# Patient Record
Sex: MALE | Race: WHITE | NOT HISPANIC OR LATINO | URBAN - METROPOLITAN AREA
[De-identification: names, ages, dates, MRNs, and addresses within clinical notes are randomized per-mention and may not be internally consistent; named-entity substitution may affect disease eponyms.]

---

## 2017-04-11 NOTE — PATIENT DISCUSSION
1. Combined Types of Cataract OD: Discussed the risks benefits alternatives and limitations of cataract surgery including infection bleeding loss of vision retinal tears detachment. Monocular status reviewed with patient. The patient stated a full understanding and a desire to proceed with the procedure in the right eye. Refractive options were reviewed. Patient has elected to be optimized for distance vision in the right eye. The patient will still need glasses for reading and to possibly fine tune distance vision. 2. Trichiasis Left Eyelid - An inturned lash of the left eyelid was present. Epilation with a forceps was reommended. 3. Pseudophakia OS - IOL stable. Monitor. 4. Abnormal lashes epilated with forceps without difficulty5. CRAO OS - stable f/u Dr Bia Locke as scheduled. 6. Glaucoma suspect OU - s/p PI for narrow angles.

## 2017-04-19 NOTE — PATIENT DISCUSSION
1. Combined Types of Cataract OD: Discussed the risks benefits alternatives and limitations of cataract surgery including infection bleeding loss of vision retinal tears detachment. Monocular status reviewed with patient. The patient stated a full understanding and a desire to proceed with the procedure in the right eye. Refractive options were reviewed. Patient has elected to be optimized for distance vision in the right eye. The patient will still need glasses for reading and to possibly fine tune distance vision. Recommend ORA to improve accuracy repeat [place] is same as first one. 2. Trichiasis Left Eyelid - An inturned lash of the left eyelid was present. Epilation with a forceps was reommended. 3. Pseudophakia OS - IOL stable. Monitor. 4. Abnormal lashes epilated with forceps without difficulty5. CRAO OS - stable f/u Dr Darron Mendoza as scheduled. 6. Glaucoma suspect OU - s/p PI for narrow angles.

## 2017-04-25 NOTE — PATIENT DISCUSSION
1.  Post-Op Day #1 - Cataract Surgery Right Eye (OD) - doing well. Tears prn. Continue postop drops as directed. Call office with symptoms of pain redness or decreased vision in operative eye. 1 drop zylet instilled. 2. Return for an appointment in 1 week for post op exam. MRx. with Dr. Rosa Samuel.

## 2017-05-02 NOTE — PATIENT DISCUSSION
1.  Post-Op Week #2 -  Cataract Surgery Right Eye (OD) - Intraocular lens stable and surgery very well healed. Patient to resume all normal activities. Finish postop drops as directed. Final Refraction given if necessary. Myopic surprise OD despite double checking IOL calcs and doing ORA. Discussed options to correct myopic surprise. Discussed risks and benefits of IOL exchange Piggyback IOL PRK including retina disorders loss of vision infection. If patient chooses to proceed with IOL exchange would recommend doing it soon due to IOl fibrosing into place. Piggyback and [address] can be done at anytime. Also discussed [address] would do @ N/C. If patient gets glasses made here INS only. 2.  Return for an appointment in 1 month for dilated fundus exam and post op exam. MRx TOPOGRAPHY. with Dr. Oly Leslie.

## 2017-06-02 NOTE — PATIENT DISCUSSION
1.  s/p Cataract Surgery Right Eye (OD) - Intraocular lens stable and surgery very well healed. Patient to resume all normal activities. Myopic surprise despite intraoperative ORA measurement. Pt happy with distance glasses options for enhancement include piggyback OD and PRK OD. Pt defers for now. 2. Pseudophakia OU - IOLs stable. Monitor. 3. Nevus OD: Choroidal lesion stable followed by Dr Alexis Gentile. Will see him in 6 months. 4. CRAO OS - stable OSh/o BCC Left lid OD no evidence of atypical lesion5. Return for an appointment in 6 months for comprehensive exam. with Dr. Suzanna Bill.

## 2018-04-11 NOTE — PATIENT DISCUSSION
1.  Pseudophakia OD - IOL stable. Monitor. 2. Nevus OD: Choroidal lesion meets the criteria specified by the COMS study for a benign lesion. Advise continued observation. Discussed in detail with the patient. The patient voiced understanding. 3.  CRAO OS - stable f/u DR Citlaly Gamez. Return for an appointment in 12 months for comprehensive exam. with Dr. Hollis Vela.

## 2018-04-11 NOTE — PATIENT DISCUSSION
Nevus OD: Choroidal lesion meets the criteria specified by the COMS study for a benign lesion. Advise continued observation. Discussed in detail with the patient. The patient voiced understanding.

## 2020-05-12 NOTE — PATIENT DISCUSSION
1. Retinal Hemorrhage OD - possible BRVO. Consult Dr. Mariusz Younger. CRAO OS - longstanding. 3.  Nevus OD: stable. Monitor. 4.  Pseudophakia OD - IOL stable. Monitor for changes in vision. 5. Epiretinal Membrane OS - mild. Monitor6. Return for an appointment in 3 months for office call and refraction with Dr. Toyin Chery.

## 2020-05-13 NOTE — PROCEDURE NOTE: CLINICAL

## 2020-05-13 NOTE — PATIENT DISCUSSION
Capsular haze appears visually significant, RECOMMEND CONSULT with  [facility] for possible YAG capsulotomy.

## 2020-05-13 NOTE — PATIENT DISCUSSION
5 13 20 NEW SRF ON OCT WITH SUPERFICIAL HEM - NO PROGRESSION ON FUNDUS OR B SCAN - DOES NOT LOOK LIKE MM - MORE LIKELY CNV 2ND AMD/NEVUS.

## 2020-06-11 NOTE — PROCEDURE NOTE: CLINICAL
PROCEDURE NOTE: Lucentis 0.5mg PFS OD. Diagnosis: Neovascular AMD with Active CNV. Prior to injection, risks/benefits/alternatives discussed including but not limited to infection, loss of vision or eye, hemorrhage, cataract, glaucoma, retinal tears or detachment. The patient wished to proceed with treatment. Topical anesthesia was induced with Alcaine. Additional anesthesia was achieved using drop(s) or injection checked above. A drop of Povidone-iodine 5% ophthalmic solution was instilled over the injection site and in the inferior fornix. Betadine prep was performed. A single use prefilled syringe of intravitreal Lucentis 0.5mg/0.05ml was used and excess was disposed of as waste. The needle was passed 3.0 mm posterior to the limbus in pseudophakic patients, and 3.5 mm posterior to the limbus in phakic patients. Injection Time 550. Patient tolerated the procedure well. There were no complications. The eye was irrigated with sterile irrigating solution. Post procedure instructions given. The patient was instructed to use Artificial Tears q.i.d. p.r.n for comfort. The patient was instructed to return for re-evaluation in approximately 4-12 weeks depending on his/her condition and was told to call immediately if vision decreases and/or if his/her eye becomes red, painful, and/or light sensitive. The patient was instructed to go to the emergency room or call 911 if unable to reach the doctor within an hour or two of trying or calling. Cecil Coe

## 2020-06-11 NOTE — PATIENT DISCUSSION
Reviewed OPTIONS including AVASTIN/EYLEA/LUCENTIS/BEOVU and patient wants to proceed with LUCENTIS treatment AND REPEAT EVERY 29 DAYS X 3.

## 2020-06-11 NOTE — PATIENT DISCUSSION
MILD HEIGHT ON B SCAN AND REFLECTIVITY SUGGEST CALCIUM. Problem: Non-Pressure Injury Wound  Goal: # No deterioration in wound  Outcome: Outcome Met, Continue evaluating goal progress toward completion  No drainage from wound    Problem: Diabetes  Goal: Glycemic balance achieved/maintained  Goal is to maintain blood sugar within range with no episodes of hypoglycemia   Outcome: Outcome Met, Continue evaluating goal progress toward completion  Patient's blood sugar WDL    Problem: VTE, Risk for  Goal: # No s/s of VTE  Outcome: Outcome Met, Continue evaluating goal progress toward completion  Patient shows no s/s of VTEs    Problem: Pressure Injury, Risk for  Intervention: # Turn/reposition patient q 2 hours  Select this intervention if patient requires turning/repositioning assistance.  Patient repositions independently    Goal: # Skin remains intact  Outcome: Outcome Met, Continue evaluating goal progress toward completion  No new skin injuries/wounds

## 2020-06-11 NOTE — PATIENT DISCUSSION
Capsular haze appears visually significant, RECOMMEND CONSULT with  [facility] for possible YAG capsulotomy.

## 2020-07-13 NOTE — PATIENT DISCUSSION
Capsular haze appears visually significant, RECOMMEND CONSULT with  [facility] for possible YAG capsulotomy.

## 2020-07-13 NOTE — PATIENT DISCUSSION
5 13 20 Reviewed OPTIONS including AVASTIN/EYLEA/LUCENTIS/BEOVU and patient wants to proceed with LUCENTIS treatment AND REPEAT EVERY 29 DAYS X 3.

## 2020-07-14 NOTE — PATIENT DISCUSSION
1.  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 IOP was 30 yesterday started on timolol yesterday with improvement in IOP. Will take a month to know full response to drops. IOP goal 20 or less. Continue with current treatment plan. Discussed importance of compliance. Will continue to monitor for stability or progression  2. Exudative Macular Degeneration OD - Treatment options were discussed including anti-VEGF injections. The importance of followup visits with Dr Padmini Patel and continued treatment was discussed. 3.  Pseudophakia OU - IOLs stable. Monitor for changes in vision. 4. Nevus OD: Choroidal lesion meets the criteria specified by the COMS study for a benign lesion. Advise continued observation. Discussed in detail with the patient. The patient voiced understanding. 5.  CRVO OS - stable monitor6. Return for an appointment in 1 month for pressure check. VF 24-2. with Dr. Mayra Hair.

## 2020-07-14 NOTE — PATIENT DISCUSSION
Retinal Hemorrhage OD - Patient was found to have a non-specific hemorrhage of the retina. The most likely etiologies of this would be Hypertension Diabetes or other vascular disorders. Patient will be followed to deturmine if other signs or symptoms become characteristic of a specific diagnosis. Recommend evaluation with primary medical practitioner.

## 2020-07-14 NOTE — PATIENT DISCUSSION
Exudative Macular Degeneration OD - Treatment options were discussed including anti-VEGF injections. The importance of followup visits with Dr Heri De La Cruz and continued treatment was discussed.

## 2020-07-14 NOTE — PATIENT DISCUSSION
Primary open angle glaucoma OD mild:  Continue with current treatment plan. Discussed importance of compliance. Will continue to monitor for stability or progression.

## 2020-07-21 NOTE — PROCEDURE NOTE: CLINICAL

## 2020-07-21 NOTE — PATIENT DISCUSSION
Capsular haze appears visually significant, RECOMMEND CONSULT with  [facility] for possible YAG capsulotomy.

## 2020-07-21 NOTE — PATIENT DISCUSSION
7 21 20 PT TO RETURN FOR REEVAL IN 5 WKS - HAVING OSTOMY BAG REMOVED ON 8/17/20 SO TO DELAY FOLLOW UP @ 1 WK.

## 2020-08-04 NOTE — PATIENT DISCUSSION
1.  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 Baseline VF test today. IOP OK on timolol Continue with current treatment plan. Discussed importance of compliance. Will continue to monitor for stability or progression  2. Exudative Macular Degeneration OD - Treatment options were discussed including anti-VEGF injections. The importance of followup visits with Dr Arelis Villegas and continued treatment was discussed. Having colon surgery and wants to get injection early and willing to pay for it out of pocket if necessary3. Pseudophakia OU - IOLs stable. Monitor for changes in vision. 4. Nevus OD: Choroidal lesion meets the criteria specified by the COMS study for a benign lesion. Advise continued observation. Discussed in detail with the patient. The patient voiced understanding.   5.  CRVO OS - stable monitor

## 2020-09-08 NOTE — PROCEDURE NOTE: CLINICAL
PROCEDURE NOTE: Lucentis 0.5mg PFS OD. Diagnosis: Neovascular AMD with Active CNV. Prior to injection, risks/benefits/alternatives discussed including but not limited to infection, loss of vision or eye, hemorrhage, cataract, glaucoma, retinal tears or detachment. The patient wished to proceed with treatment. Topical anesthesia was induced with Alcaine. Additional anesthesia was achieved using drop(s) or injection checked above. A drop of Povidone-iodine 5% ophthalmic solution was instilled over the injection site and in the inferior fornix. Betadine prep was performed. A single use prefilled syringe of intravitreal Lucentis 0.5mg/0.05ml was used and excess was disposed of as waste. The needle was passed 3.0 mm posterior to the limbus in pseudophakic patients, and 3.5 mm posterior to the limbus in phakic patients. Injection Time 550. Patient tolerated the procedure well. There were no complications. The eye was irrigated with sterile irrigating solution. Post procedure instructions given. The patient was instructed to use Artificial Tears q.i.d. p.r.n for comfort. The patient was instructed to return for re-evaluation in approximately 4-12 weeks depending on his/her condition and was told to call immediately if vision decreases and/or if his/her eye becomes red, painful, and/or light sensitive. The patient was instructed to go to the emergency room or call 911 if unable to reach the doctor within an hour or two of trying or calling. Mo Yarbrough

## 2020-09-08 NOTE — PATIENT DISCUSSION
Capsular haze appears visually significant, RECOMMEND CONSULT with  [facility] for possible YAG capsulotomy.

## 2020-09-08 NOTE — PATIENT DISCUSSION
9 8 20 PATENT PI - STILL NARROW WITH INCREASED IOP - SAW DR JENNINGS 8 4 20 - RECOM REEVAL IN NEXT FEW DAYS.

## 2020-09-21 NOTE — PATIENT DISCUSSION
9 21 20 MOVEMENT OF SRF UNDER FOVEA DESPITE L TX 13 DAYS AGO - TRACKED FROM SUPPERIORLY - TO REEVAL IN 15 DAYS AND CONSIDER SWITCHING TO EYELEA.

## 2020-09-21 NOTE — PATIENT DISCUSSION
Capsular haze appears visually significant, RECOMMEND CONSULT with  [facility] for possible YAG capsulotomy.

## 2020-10-07 NOTE — PATIENT DISCUSSION
Recommend OBSERVATION and continued MONITORING for progression to exudative AMD. Metabolic acidosis, increased anion gap Metabolic acidosis, increased anion gap COPD (chronic obstructive pulmonary disease)

## 2020-10-07 NOTE — PROCEDURE NOTE: CLINICAL

## 2020-10-09 NOTE — PATIENT DISCUSSION
Exudative Macular Degeneration OD - Treatment options were discussed including anti-VEGF injections. The importance of followup visits with retina and continued treament was discussed. Having injections every 29 days.

## 2020-10-09 NOTE — PATIENT DISCUSSION
1.  [facility] patent some shallowing temporal but deep everywhere else. IOP borderline add latanoprost qhs. Discussed importance of compliance. Will continue to monitor for stability or progression  2. Exudative Macular Degeneration OD - Treatment options were discussed including anti-VEGF injections. The importance of followup visits with retina and continued treament was discussed. Having injections every 29 days. 3.  PCO  OD (Posterior Capsule Opacification)  t/c yag caps after glaucoma is stable. DF next visit with MRX 4. Pseudophakia OU - IOLs stable. Monitor for changes in vision. 5. CRAO OS - old stableReturn for an appointment in 3 weeks for dilated fundus exam. MRx. OD only with Dr. Michael Henderson.

## 2020-10-09 NOTE — PATIENT DISCUSSION
CRVO OS - old stableReturn for an appointment in 3 weeks for dilated fundus exam. MRx. OD only with Dr. Kamaljit Merino.

## 2020-10-09 NOTE — PATIENT DISCUSSION
PCO  OD (Posterior Capsule Opacification)  t/c yag caps after glaucoma is stable.   DF next visit with KAT

## 2020-10-30 NOTE — PATIENT DISCUSSION
1.  PCO  OU (Posterior Capsule Opacification)   PCO is visually significant and impairment of vision does not meet the patient’s functional needs or interferes with activities of daily living. Risks benefits and alternatives to the Nd:YAG Laser reviewed including elevated IOP immediately postop and retinal tear/detachment. Patient to notify their ophthalmologist promptly if they have a significant change in symptoms such as flashes of light (photopsia) an increase in floaters loss of visual field or decrease in visual acuity after the procedure. Patient will be scheduled in [place] [id number] for Nd:YAG Laser. Schedule yag caps OD/OS vision will be limited by the retina. Pt sees Dr Jhonatan Jamil next week for [employer]. 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 Continue with current treatment plan. Discussed importance of compliance. Will continue to monitor for stability or progression    3. Exudative Macular Degeneration OD - Treatment options were discussed including anti-VEGF injections. The importance of followup visits with retina and continued treament was discussed. 4.  CRAO OS - stable5. Pseudophakia OU - IOLs stable. Monitor for changes in vision. 6. Return for an appointment for Yag Capsulotomy. OD/OS 4 month DFTA with OCT nerve with Dr. Kaila Christian.

## 2020-10-30 NOTE — PATIENT DISCUSSION
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 Continue with current treatment plan. Discussed importance of compliance.   Will continue to monitor for stability or progression

## 2020-10-30 NOTE — PATIENT DISCUSSION
Return for an appointment for Yag Capsulotomy. OD/OS 4 month DFTA with OCT nerve with Dr. Elvis Arciniega.

## 2020-11-05 NOTE — PROCEDURE NOTE: CLINICAL

## 2020-12-07 NOTE — PROCEDURE NOTE: CLINICAL

## 2020-12-07 NOTE — PATIENT DISCUSSION
9 8 20 PATENT PI - STILL NARROW WITH INCREASED IOP - SAW DR EJNNINGS 8 4 20 - RECOM REEVAL IN NEXT FEW DAYS.

## 2021-01-06 NOTE — PROCEDURE NOTE: CLINICAL
PROCEDURE NOTE: Lucentis 0.5mg PFS OD. Diagnosis: Neovascular AMD with Active CNV. Anesthesia: Topical. Prep: Betadine Flush. Prior to injection, risks/benefits/alternatives discussed including but not limited to infection, loss of vision or eye, hemorrhage, cataract, glaucoma, retinal tears or detachment. The patient wished to proceed with treatment. Topical anesthesia was induced with Alcaine. Additional anesthesia was achieved using drop(s) or injection checked above. A drop of Povidone-iodine 5% ophthalmic solution was instilled over the injection site and in the inferior fornix. Betadine prep was performed. A single use prefilled syringe of intravitreal Lucentis 0.5mg/0.05ml was used and excess was disposed of as waste. The needle was passed 3.0 mm posterior to the limbus in pseudophakic patients, and 3.5 mm posterior to the limbus in phakic patients. Injection Time 5:35 PM. Patient tolerated the procedure well. There were no complications. The eye was irrigated with sterile irrigating solution. Post procedure instructions given. The patient was instructed to use Artificial Tears q.i.d. p.r.n for comfort. The patient was instructed to return for re-evaluation in approximately 4-12 weeks depending on his/her condition and was told to call immediately if vision decreases and/or if his/her eye becomes red, painful, and/or light sensitive. The patient was instructed to go to the emergency room or call 911 if unable to reach the doctor within an hour or two of trying or calling. Antonio Stone
normal mouth and gums

## 2021-03-08 NOTE — PROCEDURE NOTE: CLINICAL
PROCEDURE NOTE: Lucentis 0.5mg PFS OD. Diagnosis: Neovascular AMD with Active CNV. Anesthesia: Lidocaine 4%. Prep: Betadine Flush. Prior to injection, risks/benefits/alternatives discussed including but not limited to infection, loss of vision or eye, hemorrhage, cataract, glaucoma, retinal tears or detachment. The patient wished to proceed with treatment. Topical anesthesia was induced with Alcaine. Additional anesthesia was achieved using drop(s) or injection checked above. A drop of Povidone-iodine 5% ophthalmic solution was instilled over the injection site and in the inferior fornix. Betadine prep was performed. A single use prefilled syringe of intravitreal Lucentis 0.5mg/0.05ml was used and excess was disposed of as waste. The needle was passed 3.0 mm posterior to the limbus in pseudophakic patients, and 3.5 mm posterior to the limbus in phakic patients. Injection Time 9:25 AM. Patient tolerated the procedure well. There were no complications. The eye was irrigated with sterile irrigating solution. Post procedure instructions given. The patient was instructed to use Artificial Tears q.i.d. p.r.n for comfort. The patient was instructed to return for re-evaluation in approximately 4-12 weeks depending on his/her condition and was told to call immediately if vision decreases and/or if his/her eye becomes red, painful, and/or light sensitive. The patient was instructed to go to the emergency room or call 911 if unable to reach the doctor within an hour or two of trying or calling. INVELTYS GIVEN PRIOR TO INJECTION, PER GABBY.

## 2021-03-19 NOTE — PATIENT DISCUSSION
1.  IOP at target OCT today stable. Continue with current treatment plan. Discussed importance of compliance. Will continue to monitor for stability or progression    2. Pseudophakia OU - IOLs stable. Monitor for changes in vision. 3. CRAO OS - 4. Exudative Macular Degeneration OD - Treatment options were discussed including anti-VEGF injections. The importance of followup visits with retina and continued treament was discussed. 5.  Return for an appointment in 6 months for pressure check. VF 24-2. with Dr. Rojelio Busch.

## 2021-03-26 ENCOUNTER — IMPORTED ENCOUNTER (OUTPATIENT)
Dept: URBAN - METROPOLITAN AREA CLINIC 1 | Facility: CLINIC | Age: 71
End: 2021-03-26

## 2021-03-26 PROBLEM — H04.123: Noted: 2021-03-26

## 2021-03-26 PROBLEM — H01.001: Noted: 2021-03-26

## 2021-03-26 PROBLEM — H01.004: Noted: 2021-03-26

## 2021-03-26 PROBLEM — H25.813: Noted: 2021-03-26

## 2021-03-26 PROBLEM — H16.143: Noted: 2021-03-26

## 2021-03-26 PROCEDURE — 99204 OFFICE O/P NEW MOD 45 MIN: CPT

## 2021-03-26 PROCEDURE — 92015 DETERMINE REFRACTIVE STATE: CPT

## 2021-03-26 NOTE — PATIENT DISCUSSION
1.  Cataract OU- Visually Significant discussed the risks benefits alternatives and limitations of cataract surgery. The patient stated a full understanding and a desire to proceed with the procedure. The patient will need to return for preop appointment with cataract measurements and to have any additional questions answered and start pre-operative eye drops as directed. Phaco PCL OS then ODPMG did not seeOtherwise follow-up 6 month 10/DFE/Glare2. RIAZ w/ PEK OU- Recommend the frequent use of AT's TID OU routinely. (sample given of BLINK)3. Anterior Blepharitis OU- Daily Hot compresses and lid scrubs were recommended. MRx for glasses was done do not release scheduling PhacoReturn for an appointment in [place]scan/H&P with Dr. Yary Contreras.

## 2021-04-05 ENCOUNTER — IMPORTED ENCOUNTER (OUTPATIENT)
Dept: URBAN - METROPOLITAN AREA CLINIC 1 | Facility: CLINIC | Age: 71
End: 2021-04-05

## 2021-04-05 PROBLEM — H25.813: Noted: 2021-04-05

## 2021-04-05 PROCEDURE — 92136 OPHTHALMIC BIOMETRY: CPT

## 2021-04-05 PROCEDURE — 99213 OFFICE O/P EST LOW 20 MIN: CPT

## 2021-04-05 NOTE — PATIENT DISCUSSION
RIAZ w/ PEK OU- Recommend ATs TID OU routinely 3. Anterior Blepharitis OU- Daily Hot compresses and lid scrubs were recommended. Return for an appointment for Return as scheduled with Dr. Anna Lewis.

## 2021-04-05 NOTE — PATIENT DISCUSSION
1.  Cataract OU:  Visually Significant secondary to glare discussed the risks benefits alternatives and limitations of cataract surgery. The patient stated a full understanding and a desire to proceed with the procedure. Discussed with patient if PO Gtts are more than $120 for all three combined when filling at their Pharmacy please call our office to request generic substitutions. Pt understands they will need glasses post-op to achieve their best corrected vision. Phaco PCL OS then OD Phaco PCL OS  Lifestyle Questionnaire Completed. 2.  RIAZ w/ PEK OU- Recommend ATs TID OU routinely 3. Anterior Blepharitis OU- Daily Hot compresses and lid scrubs were recommended. Return for an appointment for Return as scheduled with Dr. Meg Mustafa.

## 2021-04-12 NOTE — PATIENT DISCUSSION
EQUIVOCAL FLUID  ON OCT 5 14 18 - MILD IMPROVEMENT. Attending Psychiatrist supervising NP/Trainee, meeting pt...

## 2021-04-13 NOTE — PROCEDURE NOTE: CLINICAL

## 2021-04-14 ENCOUNTER — IMPORTED ENCOUNTER (OUTPATIENT)
Dept: URBAN - METROPOLITAN AREA CLINIC 1 | Facility: CLINIC | Age: 71
End: 2021-04-14

## 2021-04-15 ENCOUNTER — IMPORTED ENCOUNTER (OUTPATIENT)
Dept: URBAN - METROPOLITAN AREA CLINIC 1 | Facility: CLINIC | Age: 71
End: 2021-04-15

## 2021-04-15 PROBLEM — Z96.1: Noted: 2021-04-15

## 2021-04-15 PROCEDURE — 99024 POSTOP FOLLOW-UP VISIT: CPT

## 2021-04-15 NOTE — PATIENT DISCUSSION
POD#1 CE/IOL OS (Standard) doing well. Use Prednisolone BID OS Bromfenac Qdaily OS [place] TID OS : Use all three gtts through completion of PO gtt chart regimen/ Per our instructions given to patient.   Post op Warnings Reiterated RTC as scheduled

## 2021-04-22 ENCOUNTER — IMPORTED ENCOUNTER (OUTPATIENT)
Dept: URBAN - METROPOLITAN AREA CLINIC 1 | Facility: CLINIC | Age: 71
End: 2021-04-22

## 2021-04-22 PROBLEM — H25.811: Noted: 2021-04-22

## 2021-04-22 PROCEDURE — 92136 OPHTHALMIC BIOMETRY: CPT

## 2021-04-23 NOTE — PATIENT DISCUSSION
Anterior Blepharitis OU - Daily Hot compresses and lid scrubs were recommended. Relevant Problems   No relevant active problems       Anesthetic History   No history of anesthetic complications            Review of Systems / Medical History  Patient summary reviewed, nursing notes reviewed and pertinent labs reviewed    Pulmonary  Within defined limits                 Neuro/Psych         Headaches     Cardiovascular  Within defined limits                     GI/Hepatic/Renal     GERD           Endo/Other  Within defined limits           Other Findings              Physical Exam    Airway  Mallampati: II  TM Distance: 4 - 6 cm  Neck ROM: normal range of motion   Mouth opening: Normal     Cardiovascular    Rhythm: regular  Rate: normal         Dental    Dentition: Lower dentition intact and Upper dentition intact     Pulmonary  Breath sounds clear to auscultation               Abdominal         Other Findings            Anesthetic Plan    ASA: 2  Anesthesia type: general          Induction: Intravenous  Anesthetic plan and risks discussed with: Patient

## 2021-04-28 ENCOUNTER — IMPORTED ENCOUNTER (OUTPATIENT)
Dept: URBAN - METROPOLITAN AREA CLINIC 1 | Facility: CLINIC | Age: 71
End: 2021-04-28

## 2021-04-29 ENCOUNTER — IMPORTED ENCOUNTER (OUTPATIENT)
Dept: URBAN - METROPOLITAN AREA CLINIC 1 | Facility: CLINIC | Age: 71
End: 2021-04-29

## 2021-04-29 PROBLEM — Z96.1: Noted: 2021-04-29

## 2021-04-29 PROCEDURE — 99024 POSTOP FOLLOW-UP VISIT: CPT

## 2021-04-29 NOTE — PATIENT DISCUSSION
1. POD#1 Phaco/ PCL OD (Standard) - doing well. Use Prednisolone BID OD Bromfenac Qdaily OD Ocuflox TID OD : Use all three gtts through completion of PO gtt chart regimen/ Per our instructions given. Post op Warnings Reiterated 2. POW#3 Phaco/ PCL OS (Standard) - doing well  Use Prednisolone BID OS & Bromfenac Qdaily OS: Use through completion of po gtt regimen.  RTC as scheduled

## 2021-05-12 NOTE — PROCEDURE NOTE: CLINICAL

## 2021-05-12 NOTE — PATIENT DISCUSSION
LUCENTIS AND REPEAT EVERY 29 DAYS X 3 DOI - TRACE SRF AT 29 DAYS - DOING WELL - RETX AND KEEPT 29 DAYS - IF CONTINUES TO DO WELL CONSIDER ^ TIME BETWEEN TXS.

## 2021-05-20 ENCOUNTER — IMPORTED ENCOUNTER (OUTPATIENT)
Dept: URBAN - METROPOLITAN AREA CLINIC 1 | Facility: CLINIC | Age: 71
End: 2021-05-20

## 2021-05-20 PROBLEM — Z09: Noted: 2021-05-20

## 2021-05-20 NOTE — PATIENT DISCUSSION
POW#3 Phaco/ PCL Standard OUFinish PO meds per PO gtt schedule MRX for glasses givenReturn for an appointment in March with Dr. Lydia Moise.

## 2021-06-10 NOTE — PROCEDURE NOTE: CLINICAL
PROCEDURE NOTE: Lucentis 0.5mg PFS OD. Diagnosis: Neovascular AMD with Active CNV. Anesthesia: Lidocaine 4%. Prep: Betadine Flush. Prior to injection, risks/benefits/alternatives discussed including but not limited to infection, loss of vision or eye, hemorrhage, cataract, glaucoma, retinal tears or detachment. The patient wished to proceed with treatment. Topical anesthesia was induced with Alcaine. Additional anesthesia was achieved using drop(s) or injection checked above. A drop of Povidone-iodine 5% ophthalmic solution was instilled over the injection site and in the inferior fornix. Betadine prep was performed. A single use prefilled syringe of intravitreal Lucentis 0.5mg/0.05ml was used and excess was disposed of as waste. The needle was passed 3.0 mm posterior to the limbus in pseudophakic patients, and 3.5 mm posterior to the limbus in phakic patients. Injection Time 2:00 PM. Patient tolerated the procedure well. There were no complications. The eye was irrigated with sterile irrigating solution. Post procedure instructions given. The patient was instructed to use Artificial Tears q.i.d. p.r.n for comfort. The patient was instructed to return for re-evaluation in approximately 4-12 weeks depending on his/her condition and was told to call immediately if vision decreases and/or if his/her eye becomes red, painful, and/or light sensitive. The patient was instructed to go to the emergency room or call 911 if unable to reach the doctor within an hour or two of trying or calling. Marjorie Salguero

## 2021-07-12 NOTE — PROCEDURE NOTE: CLINICAL

## 2021-08-11 NOTE — PROCEDURE NOTE: CLINICAL

## 2021-09-14 NOTE — PROCEDURE NOTE: CLINICAL
PROCEDURE NOTE: Lucentis 0.5mg PFS OD. Diagnosis: Neovascular AMD with Active CNV. Anesthesia: Lidocaine 4%. Prep: Betadine Flush. Prior to injection, risks/benefits/alternatives discussed including but not limited to infection, loss of vision or eye, hemorrhage, cataract, glaucoma, retinal tears or detachment. The patient wished to proceed with treatment. Topical anesthesia was induced with Alcaine. Additional anesthesia was achieved using drop(s) or injection checked above. A drop of Povidone-iodine 5% ophthalmic solution was instilled over the injection site and in the inferior fornix. Betadine prep was performed. A single use prefilled syringe of intravitreal Lucentis 0.5mg/0.05ml was used and excess was disposed of as waste. The needle was passed 3.0 mm posterior to the limbus in pseudophakic patients, and 3.5 mm posterior to the limbus in phakic patients. Injection Time 4:00 PM. Patient tolerated the procedure well. There were no complications. The eye was irrigated with sterile irrigating solution. Post procedure instructions given. The patient was instructed to use Artificial Tears q.i.d. p.r.n for comfort. The patient was instructed to return for re-evaluation in approximately 4-12 weeks depending on his/her condition and was told to call immediately if vision decreases and/or if his/her eye becomes red, painful, and/or light sensitive. The patient was instructed to go to the emergency room or call 911 if unable to reach the doctor within an hour or two of trying or calling. INVELTYS GIVEN PRIOR TO INJECTION, PER CHRISTI Zayas

## 2021-09-14 NOTE — PATIENT DISCUSSION
5 12 21 LUCENTIS AND REPEAT EVERY 29 DAYS X 3 DOI - TRACE SRF AT 29 DAYS - DOING WELL - RETX AND KEEPT 29 DAYS - IF CONTINUES TO DO WELL CONSIDER ^ TIME BETWEEN TXS.

## 2021-09-22 NOTE — PATIENT DISCUSSION
1.  IOP at target IOP VF today stable. Continue with current treatment plan. Discussed importance of compliance. Will continue to monitor for stability or progression    2. Pseudophakia OU - IOLs stable. Monitor for changes in vision. 3. CRAO OS - 4. Exudative Macular Degeneration OD - Treatment options were discussed including anti-VEGF injections. The importance of followup visits with retina and continued treament was discussed. Return for an appointment in 6 months for comprehensive exam. OCT Nerve. MRx. pt interested in new glasses with Dr. Garcia Erm.

## 2021-10-20 NOTE — PATIENT DISCUSSION
Follow up with Dr. Durham from infectious disease    Follow up with Annual testing in November.    RESOLVED 5 12 21.

## 2021-10-20 NOTE — PROCEDURE NOTE: CLINICAL

## 2021-11-18 NOTE — PROCEDURE NOTE: CLINICAL

## 2021-12-29 NOTE — PATIENT DISCUSSION
12 29 21 PT NEEDS ASA TO PREVENT A STROKE - REVIEWED RISK OF RETINAL BLEED - BUT RISK OF STROKE OUTWEIGHS RISK OF SRH.

## 2021-12-29 NOTE — PROCEDURE NOTE: CLINICAL
PROCEDURE NOTE: Lucentis 0.5mg PFS OD. Diagnosis: Neovascular AMD with Active CNV. Anesthesia: Lidocaine 4%. Prep: Betadine Flush. Prior to injection, risks/benefits/alternatives discussed including but not limited to infection, loss of vision or eye, hemorrhage, cataract, glaucoma, retinal tears or detachment. The patient wished to proceed with treatment. Topical anesthesia was induced with Alcaine. Additional anesthesia was achieved using drop(s) or injection checked above. A drop of Povidone-iodine 5% ophthalmic solution was instilled over the injection site and in the inferior fornix. Betadine prep was performed. A single use prefilled syringe of intravitreal Lucentis 0.5mg/0.05ml was used and excess was disposed of as waste. The needle was passed 3.0 mm posterior to the limbus in pseudophakic patients, and 3.5 mm posterior to the limbus in phakic patients. Injection Time 2:00 PM. Patient tolerated the procedure well. There were no complications. The eye was irrigated with sterile irrigating solution. Post procedure instructions given. The patient was instructed to use Artificial Tears q.i.d. p.r.n for comfort. The patient was instructed to return for re-evaluation in approximately 4-12 weeks depending on his/her condition and was told to call immediately if vision decreases and/or if his/her eye becomes red, painful, and/or light sensitive. The patient was instructed to go to the emergency room or call 911 if unable to reach the doctor within an hour or two of trying or calling. Maribell Galvin

## 2021-12-29 NOTE — PATIENT DISCUSSION
9 14 21 MILD EDEMA SUP TO FOVEA 5 WKS - RETX L AND KEEP 5 WKS. Area H Indication Text: Tumors in this location are included in Area H (eyelids, eyebrows, nose, lips, chin, ear, pre-auricular, post-auricular, temple, genitalia, hands, feet, ankles and areola).  Tissue conservation is critical in these anatomic locations.

## 2022-02-02 NOTE — PROCEDURE NOTE: CLINICAL
PROCEDURE NOTE: Lucentis 0.5mg PFS OD. Diagnosis: Neovascular AMD with Active CNV. Anesthesia: Lidocaine 4%. Prep: Betadine Flush. Prior to injection, risks/benefits/alternatives discussed including but not limited to infection, loss of vision or eye, hemorrhage, cataract, glaucoma, retinal tears or detachment. The patient wished to proceed with treatment. Topical anesthesia was induced with Alcaine. Additional anesthesia was achieved using drop(s) or injection checked above. A drop of Povidone-iodine 5% ophthalmic solution was instilled over the injection site and in the inferior fornix. Betadine prep was performed. A single use prefilled syringe of intravitreal Lucentis 0.5mg/0.05ml was used and excess was disposed of as waste. The needle was passed 3.0 mm posterior to the limbus in pseudophakic patients, and 3.5 mm posterior to the limbus in phakic patients. Injection Time 1:45PM. Patient tolerated the procedure well. There were no complications. The eye was irrigated with sterile irrigating solution. Post procedure instructions given. The patient was instructed to use Artificial Tears q.i.d. p.r.n for comfort. The patient was instructed to return for re-evaluation in approximately 4-12 weeks depending on his/her condition and was told to call immediately if vision decreases and/or if his/her eye becomes red, painful, and/or light sensitive. The patient was instructed to go to the emergency room or call 911 if unable to reach the doctor within an hour or two of trying or calling. Fabien Weber

## 2022-03-09 NOTE — PROCEDURE NOTE: CLINICAL

## 2022-03-23 NOTE — PATIENT DISCUSSION
1.  Poag OD stable IOP at target IOP OCT  stable. Continue with current treatment plan. Discussed importance of compliance. Will continue to monitor for stability or progression    2. Pseudophakia OU - IOLs stable. Monitor for changes in vision. 3. CRAO OS - stable4. Exudative Macular Degeneration OD - Treatment options were discussed including anti-VEGF injections. The importance of followup visits with retina and continued treament was discussed. Return for an appointment in 6 months for pressure check. VF 24-2. with Dr. Dick Jacob.

## 2022-03-23 NOTE — PATIENT DISCUSSION
7 13 20 TO START T 1/2 BID AND RECOM EVAL  [facility] W/IN 1-2 DAYS. forehead

## 2022-04-08 ASSESSMENT — VISUAL ACUITY
OD_CC: J2
OD_CC: 20/60
OS_GLARE: 20/200
OD_CC: 20/50-2
OD_GLARE: 20/150
OD_GLARE: 20/150
OS_SC: 20/40
OD_CC: 20/70
OD_SC: 20/25-2
OS_CC: 20/70
OS_CC: 20/25-2
OS_GLARE: 20/200
OS_CC: 20/25
OD_GLARE: 20/150
OS_CC: 20/50
OS_CC: J3
OD_CC: J2
OS_SC: 20/40
OS_CC: 20/70
OS_CC: 20/50
OS_PH: SC 20/30
OS_CC: J3
OD_SC: 20/25
OD_CC: 20/70
OD_CC: 20/25-2

## 2022-04-08 ASSESSMENT — KERATOMETRY
OS_K2POWER_DIOPTERS: 45.75
OS_K2POWER_DIOPTERS: 45.25
OS_K1POWER_DIOPTERS: 44.00
OD_K1POWER_DIOPTERS: 44.25
OD_K2POWER_DIOPTERS: 45.50
OD_K1POWER_DIOPTERS: 44.25
OD_AXISANGLE2_DEGREES: 009
OD_AXISANGLE2_DEGREES: 178
OS_AXISANGLE2_DEGREES: 162
OS_AXISANGLE2_DEGREES: 002
OD_AXISANGLE_DEGREES: 088
OS_AXISANGLE_DEGREES: 092
OS_AXISANGLE_DEGREES: 072
OS_K1POWER_DIOPTERS: 44.25
OD_K2POWER_DIOPTERS: 45.50
OD_AXISANGLE_DEGREES: 099

## 2022-04-08 ASSESSMENT — TONOMETRY
OS_IOP_MMHG: 16
OD_IOP_MMHG: 16
OS_IOP_MMHG: 16
OS_IOP_MMHG: 16
OD_IOP_MMHG: 16
OS_IOP_MMHG: 16
OS_IOP_MMHG: 16
OD_IOP_MMHG: 16
OD_IOP_MMHG: 16

## 2022-04-13 ENCOUNTER — COMPREHENSIVE EXAM (OUTPATIENT)
Dept: URBAN - METROPOLITAN AREA CLINIC 1 | Facility: CLINIC | Age: 72
End: 2022-04-13

## 2022-04-13 DIAGNOSIS — Z96.1: ICD-10-CM

## 2022-04-13 DIAGNOSIS — H04.123: ICD-10-CM

## 2022-04-13 DIAGNOSIS — H01.021: ICD-10-CM

## 2022-04-13 DIAGNOSIS — H01.024: ICD-10-CM

## 2022-04-13 DIAGNOSIS — H16.143: ICD-10-CM

## 2022-04-13 PROCEDURE — 92015 DETERMINE REFRACTIVE STATE: CPT

## 2022-04-13 PROCEDURE — 92014 COMPRE OPH EXAM EST PT 1/>: CPT

## 2022-04-13 ASSESSMENT — VISUAL ACUITY
OS_CC: 20/25
OS_CC: J2
OD_CC: 20/25
OD_CC: J2

## 2022-04-13 ASSESSMENT — TONOMETRY
OS_IOP_MMHG: 16
OD_IOP_MMHG: 16

## 2022-04-20 NOTE — PROCEDURE NOTE: CLINICAL

## 2022-05-25 NOTE — PROCEDURE NOTE: CLINICAL

## 2022-07-06 NOTE — PROCEDURE NOTE: CLINICAL
PROCEDURE NOTE: Lucentis 0.5mg PFS OD. Diagnosis: Neovascular AMD with Active CNV. Anesthesia: Lidocaine 4%. Prep: Betadine Flush. Prior to injection, risks/benefits/alternatives discussed including but not limited to infection, loss of vision or eye, hemorrhage, cataract, glaucoma, retinal tears or detachment. The patient wished to proceed with treatment. Topical anesthesia was induced with Alcaine. Additional anesthesia was achieved using drop(s) or injection checked above. A drop of Povidone-iodine 5% ophthalmic solution was instilled over the injection site and in the inferior fornix. Betadine prep was performed. A single use prefilled syringe of intravitreal Lucentis 0.5mg/0.05ml was used and excess was disposed of as waste. The needle was passed 3.0 mm posterior to the limbus in pseudophakic patients, and 3.5 mm posterior to the limbus in phakic patients. Injection Time *. Patient tolerated the procedure well. There were no complications. The eye was irrigated with sterile irrigating solution. Post procedure instructions given. The patient was instructed to use Artificial Tears q.i.d. p.r.n for comfort. The patient was instructed to return for re-evaluation in approximately 4-12 weeks depending on his/her condition and was told to call immediately if vision decreases and/or if his/her eye becomes red, painful, and/or light sensitive. The patient was instructed to go to the emergency room or call 911 if unable to reach the doctor within an hour or two of trying or calling. Maribell Galvin

## 2022-08-17 NOTE — PROCEDURE NOTE: CLINICAL
PROCEDURE NOTE: Lucentis 0.5mg PFS OD. Diagnosis: Neovascular AMD with Active CNV. Anesthesia: Lidocaine 4%. Prep: Betadine Flush. Prior to injection, risks/benefits/alternatives discussed including but not limited to infection, loss of vision or eye, hemorrhage, cataract, glaucoma, retinal tears or detachment. The patient wished to proceed with treatment. Topical anesthesia was induced with Alcaine. Additional anesthesia was achieved using drop(s) or injection checked above. A drop of Povidone-iodine 5% ophthalmic solution was instilled over the injection site and in the inferior fornix. Betadine prep was performed. A single use prefilled syringe of intravitreal Lucentis 0.5mg/0.05ml was used and excess was disposed of as waste. The needle was passed 3.0 mm posterior to the limbus in pseudophakic patients, and 3.5 mm posterior to the limbus in phakic patients. Injection Time 11;30AM. Patient tolerated the procedure well. There were no complications. The eye was irrigated with sterile irrigating solution. Post procedure instructions given. The patient was instructed to use Artificial Tears q.i.d. p.r.n for comfort. The patient was instructed to return for re-evaluation in approximately 4-12 weeks depending on his/her condition and was told to call immediately if vision decreases and/or if his/her eye becomes red, painful, and/or light sensitive. The patient was instructed to go to the emergency room or call 911 if unable to reach the doctor within an hour or two of trying or calling. Maribell Galvin

## 2022-08-24 NOTE — PROCEDURE NOTE: CLINICAL

## 2022-10-05 NOTE — PROCEDURE NOTE: CLINICAL
PROCEDURE NOTE: Lucentis 0.5mg PFS OD. Diagnosis: Neovascular AMD with Active CNV. Anesthesia: Lidocaine 4%. Prep: Betadine Flush. Prior to injection, risks/benefits/alternatives discussed including but not limited to infection, loss of vision or eye, hemorrhage, cataract, glaucoma, retinal tears or detachment. The patient wished to proceed with treatment. Topical anesthesia was induced with Alcaine. Additional anesthesia was achieved using drop(s) or injection checked above. A drop of Povidone-iodine 5% ophthalmic solution was instilled over the injection site and in the inferior fornix. Betadine prep was performed. A single use prefilled syringe of intravitreal Lucentis 0.5mg/0.05ml was used and excess was disposed of as waste. The needle was passed 3.0 mm posterior to the limbus in pseudophakic patients, and 3.5 mm posterior to the limbus in phakic patients. Injection Time 1:58. Patient tolerated the procedure well. There were no complications. The eye was irrigated with sterile irrigating solution. Post procedure instructions given. The patient was instructed to use Artificial Tears q.i.d. p.r.n for comfort. The patient was instructed to return for re-evaluation in approximately 4-12 weeks depending on his/her condition and was told to call immediately if vision decreases and/or if his/her eye becomes red, painful, and/or light sensitive. The patient was instructed to go to the emergency room or call 911 if unable to reach the doctor within an hour or two of trying or calling. Hillary Arita

## 2022-11-28 NOTE — PROCEDURE NOTE: CLINICAL
PROCEDURE NOTE: Lucentis 0.5mg PFS OD. Diagnosis: Neovascular AMD with Active CNV. Anesthesia: Lidocaine 4%. Prep: Betadine Flush. Prior to injection, risks/benefits/alternatives discussed including but not limited to infection, loss of vision or eye, hemorrhage, cataract, glaucoma, retinal tears or detachment. The patient wished to proceed with treatment. Topical anesthesia was induced with Alcaine. Additional anesthesia was achieved using drop(s) or injection checked above. A drop of Povidone-iodine 5% ophthalmic solution was instilled over the injection site and in the inferior fornix. Betadine prep was performed. A single use prefilled syringe of intravitreal Lucentis 0.5mg/0.05ml was used and excess was disposed of as waste. The needle was passed 3.0 mm posterior to the limbus in pseudophakic patients, and 3.5 mm posterior to the limbus in phakic patients. Injection Time 1:30PM. Patient tolerated the procedure well. There were no complications. The eye was irrigated with sterile irrigating solution. Post procedure instructions given. The patient was instructed to use Artificial Tears q.i.d. p.r.n for comfort. The patient was instructed to return for re-evaluation in approximately 4-12 weeks depending on his/her condition and was told to call immediately if vision decreases and/or if his/her eye becomes red, painful, and/or light sensitive. The patient was instructed to go to the emergency room or call 911 if unable to reach the doctor within an hour or two of trying or calling. Anyi Gómez

## 2023-01-23 NOTE — PROCEDURE NOTE: CLINICAL
PROCEDURE NOTE: Lucentis 0.5mg PFS OD. Diagnosis: Neovascular AMD with Active CNV. Anesthesia: Lidocaine 4%. Prep: Betadine Flush. Prior to injection, risks/benefits/alternatives discussed including but not limited to infection, loss of vision or eye, hemorrhage, cataract, glaucoma, retinal tears or detachment. The patient wished to proceed with treatment. Topical anesthesia was induced with Alcaine. Additional anesthesia was achieved using drop(s) or injection checked above. A drop of Povidone-iodine 5% ophthalmic solution was instilled over the injection site and in the inferior fornix. Betadine prep was performed. A single use prefilled syringe of intravitreal Lucentis 0.5mg/0.05ml was used and excess was disposed of as waste. The needle was passed 3.0 mm posterior to the limbus in pseudophakic patients, and 3.5 mm posterior to the limbus in phakic patients. Injection Time 1:20. Patient tolerated the procedure well. There were no complications. The eye was irrigated with sterile irrigating solution. Post procedure instructions given. The patient was instructed to use Artificial Tears q.i.d. p.r.n for comfort. The patient was instructed to return for re-evaluation in approximately 4-12 weeks depending on his/her condition and was told to call immediately if vision decreases and/or if his/her eye becomes red, painful, and/or light sensitive. The patient was instructed to go to the emergency room or call 911 if unable to reach the doctor within an hour or two of trying or calling. Dariel Connor

## 2023-05-09 NOTE — PATIENT DISCUSSION
5 13 20 NEW SRF ON OCT WITH SUPERFICIAL HEM - NO PROGRESSION ON FUNDUS OR B SCAN - DOES NOT LOOK LIKE MM - MORE LIKELY CNV 2ND AMD/NEVUS. [phone]

## 2023-07-06 NOTE — PATIENT DISCUSSION
Reason for Disposition  • Message left on unidentified answering machine.  Phone number verified per call center policy.    Protocols used: NO CONTACT OR DUPLICATE CONTACT CALL-P-AH     12 29 21 PT NEEDS ASA TO PREVENT A STROKE - REVIEWED RISK OF RETINAL BLEED - BUT RISK OF STROKE OUTWEIGHS RISK OF SRH.

## 2024-05-10 NOTE — PATIENT DISCUSSION
NEW SRF ON OCT AND NOT ON B SCAN 11 13 17. Please reach out to the psychiatry phone number provided below to help with your PTSD symptoms so you can be provided better relief with what you are currently experiencing.   You may get the sutures wet, but I do not recommend submerging the wound in water (ie pool/baths) to encourage better wound healing.   Keep the wound clean and dry. Wash with warm soapy water, not alcohol or hydrogen peroxide.   Continue trying to refrain from alcohol use.   Please return to urgent care or here for suture removal in 10 days or so.

## 2024-09-16 NOTE — PATIENT DISCUSSION
MILD HEIGHT ON B SCAN AND REFLECTIVITY SUGGEST CALCIUM. Quality 226: Preventive Care And Screening: Tobacco Use: Screening And Cessation Intervention: Patient screened for tobacco use and is an ex/non-smoker Detail Level: Detailed Quality 47: Advance Care Plan: Advance Care Planning discussed and documented in the medical record; patient did not wish or was not able to name a surrogate decision maker or provide an advance care plan.
